# Patient Record
(demographics unavailable — no encounter records)

---

## 2024-10-18 NOTE — REASON FOR VISIT
Congratulations again on the birth of your baby!      The first six weeks following your delivery are known as the postpartum period.  Here are some general instructions to help both you and your baby have a healthy and happy postpartum recovery.       Rest & Sleep:  Focus on yourself and baby during this time, and get plenty of rest for the first few weeks.    Sleep when your baby sleeps and allow support people help you rest (care for other children, prepare meals, shopping, cleaning, etc).  Do not lift anything heavier than your baby.  Take short walks if possible throughout the day.     Nutrition:  Eat nutritious and well balanced meals to provide your body the energy it needs.  Drink at least 8 glasses of water every day (try drinking a glass of water every time you feed the baby).  Do not diet at this time.  Breastfeeding mothers require extra fluid, calories, protein and calcium.   Avoid tobacco, alcohol and non-essential medications when breastfeeding.      Postpartum Bleeding ([place]):  Expect bleeding for up to 6 weeks.  Expect normal period odor from your bleeding.  Change your sanitary pad often, and wash your hands before changing.  DO NOT have intercourse, use tampons, or place anything in your vagina for 6-8 weeks to allow your body time to heal.     Call your midwife for foul smelling vaginal discharge, large clots, or heavy bleeding (saturating a pad in an hour).     Care of the Perineum after Vaginal Birth  Expect some swelling and tenderness for several days, especially if a tear occurred.    Use squirt bottle after urination and bowel movements  After urinating, pat dry but do not wipe.  Ice packs, Tucks pads, and/or numbing spray (Dermoplast) can be used for discomfort.     Breast Care for Breastfeeding Mothers:  Wear a supportive bra  Feed your baby on demand or at least every 2-3 hours (or 8-10 times per 24 hour day) to avoid engorgement  Position your baby's nose to be aligned with your  nipple and wait for wide open jaw (try rubbing nipple below baby's nose) and aim nipple toward the roof of your baby's mouth.   Break suction with clean finger if poor latch occurs.  Prevent sore nipples by ensuring proper latch and rubbing your expressed breast milk on your nipples after every breastfeed.   If using a breast pump, prevent sore nipples by ensuring proper flange fit and rubbing your expressed breast milk on your nipples after every pumping session.   Cracked nipples can be treated with correcting the improper latch, soothed with gel pads or dabbed with breastmilk and allowed to air dry.   If using a pump, keep clean by washing parts with warm water and dish soap after each pumping session, and allow parts to air dry on clean paper towel.  Plugged ducts are sore hard rock-like areas in the substance of the breast that can be treated by taking a warm shower, nursing your baby by positioning chin toward blockage, and massaging area toward nipple.  Mastitis is a breast infection that may include the following symptoms: fever; red, very sore area of breast; flu-like symptoms.  Call your doctor/midwife right away if these symptoms occur.     Don't hesitate to reach out to the number listed at the bottom of these instructions for additional lactation assistance and support.       Postpartum Family Planning:  Continue discussions with your provider at your follow up visit.      Postpartum Adjustment:   Becoming a mother involves many changes to your mind and body. Although you may have expected to be excited and happy, instead you may be unsure of yourself in your new role, and you may find that you are easily upset, impatient, irritable or tearful. This is normal and comes and goes quickly.  \"Baby blues\" may occur anywhere from a few days after delivery to several weeks postpartum and will pass in a short time.      Postpartum Depression:  Postpartum depression goes beyond \"baby blues\" and is persistent,  intense, and severe.  The most common symptom is a feeling of deep sadness.  You may also feel as if you just can't cope with life.  Other symptoms include:  thoughts of harming yourself or the baby  lack of interest in the baby, family, or friends  feeling guilty or worthless  feeling hopeless  uncontrollable crying  feelings of being a bad mother  trouble sleeping, oversleeping or feeling tired all the time  changes in appetite  strong feelings of irritability, anger, or nervousness  having trouble thinking clearly or making decisions  having headaches, aches and pains, or stomach problems that won't go away  thinking about death or suicide     The exact cause of postpartum depression is unknown. Changes in brain chemistry or structure are believed to play a big role in depression. It may be due to changes in your hormones during and after childbirth. You may also be tired from caring for your baby and adjusting to being a mother. All these factors may make you feel depressed. In some cases, your genes may also play a role.     Postpartum depression can be treated in many ways.  Talking to your healthcare provider is the first step toward feeling better.     Call your midwife immediately if you:  Cry for no clear reason  Have trouble sleeping, eating, and making choices  Question whether you can handle caring for your baby  Have intense feelings of sadness, anxiety, or despair that prevent you from being able to do your daily tasks     Here are some additional resources offering support for Postpartum Depression:     Postpartum Support International: (776) 895-8333   National [place] for Mental Health: (641) 102-1992  National Suicide Prevention (552) 511-1969  Postpartum Progress https://postpartumprogress.com/                            Postpartum Preeclampsia: A serious condition that occurs when a woman has high blood pressure and excess protein in her urine soon after childbirth. It usually occurs within a  few days of birth, but can develop up to 6 weeks after having the baby. Preeclampsia can result in seizures and other serious complications and requires prompt treatment.      Call your doctor or midwife immediately if you experience any of the following symptoms that could be signs of Preeclampsia:  Increased swelling in your face, hands or legs  severe headache that doesn't go away  abdominal pain  shortness of breath / difficulty breathing  nausea and vomiting   confusion  vision changes:blurred vision or flashing spots   gain more than 3 pounds in three days     Summary of when to call your doctor or midwife:  Foul smelling vaginal discharge  Passing large blood clots or heavy bleeding (saturating a pad in an hour)  Fever above 100.4F  Redness, swelling, increased pain or drainage from incision (if you had a )  Redness & pain in any area of breasts  Flu-like symptoms (such as fever, chills, body aches, etc.)  Fainting, dizziness or lightheadedness  Postpartum depression: Crying for no clear reason, having trouble sleeping, eating, and making choices; questioning whether you can handle caring for your baby; having intense feelings of sadness, anxiety, or despair that prevent you from being able to do your daily tasks  Preeclampsia symptoms: increased swelling in face, hands or legs; headache,abdominal pain, shortness of breath, nausea and vomiting, confusion, vision changes, gaining more than 3 pounds in 3 days.    New or worsening symptoms or pain, not relieved by medicine or rest         Our Hospital and Medical team have enjoyed caring for you during this special time, and we wish you a safe and healthy recovery.      If you have questions about these discharge instructions or any warning signs, please call your the Midwife on-call at [phone].  If you need additional support with breastfeeding, you can reach Lactation Support at [phone].     [Initial Consult] : an initial consult for [FreeTextEntry2] : weight loss options

## 2024-10-18 NOTE — HISTORY OF PRESENT ILLNESS
[de-identified] :   Ms. JONNY HERMAN is a 18 year old obese woman.  The patient presents requesting discussion of weight loss surgery vs weight loss medication therapy. She has been more than 75 lb. overweight for greater than 5 years.   The patient has tried numerous weight loss programs including self-directed and physician supervised diets and self-directed exercise programs. She has lost greater than 10 pounds on more than one occasion, however, has experienced weight regain despite her best efforts with healthy diet and exercise.

## 2024-10-18 NOTE — CONSULT LETTER
[Dear  ___] : Dear  [unfilled], [Consult Letter:] : I had the pleasure of evaluating your patient, [unfilled]. [Please see my note below.] : Please see my note below. [Consult Closing:] : Thank you very much for allowing me to participate in the care of this patient.  If you have any questions, please do not hesitate to contact me. [Sincerely,] : Sincerely, [FreeTextEntry3] : Chad Brumfield MD, FACS Director of Bariatric Surgery [facility]  Assistant Professor of Surgery  [facility] [facility] [employer] at [facility]

## 2024-10-18 NOTE — CONSULT LETTER
[Dear  ___] : Dear  [unfilled], [Consult Letter:] : I had the pleasure of evaluating your patient, [unfilled]. [Please see my note below.] : Please see my note below. [Consult Closing:] : Thank you very much for allowing me to participate in the care of this patient.  If you have any questions, please do not hesitate to contact me. [Sincerely,] : Sincerely, [FreeTextEntry3] : Chad Brumfield MD, FACS Director of Bariatric Surgery [facility]  Assistant Professor of Surgery  [facility] [employer] at [facility]

## 2024-10-18 NOTE — HISTORY OF PRESENT ILLNESS
[de-identified] :   Ms. JONNY HERMAN is a 18 year old obese woman.  The patient presents requesting discussion of weight loss surgery vs weight loss medication therapy. She has been more than 75 lb. overweight for greater than 5 years.   The patient has tried numerous weight loss programs including self-directed and physician supervised diets and self-directed exercise programs. She has lost greater than 10 pounds on more than one occasion, however, has experienced weight regain despite her best efforts with healthy diet and exercise.

## 2024-10-18 NOTE — CONSULT LETTER
[Dear  ___] : Dear  [unfilled], [Consult Letter:] : I had the pleasure of evaluating your patient, [unfilled]. [Please see my note below.] : Please see my note below. [Consult Closing:] : Thank you very much for allowing me to participate in the care of this patient.  If you have any questions, please do not hesitate to contact me. [Sincerely,] : Sincerely, [FreeTextEntry3] : Chad Brumfield MD, FACS Director of Bariatric Surgery [facility]  Assistant Professor of Surgery  [facility] [employer] at [facility] [place]

## 2024-10-18 NOTE — ASSESSMENT
[FreeTextEntry1] :  Ms. JONNY HERMAN is a 18 year old woman with morbid obesity who is unable to lose weight and lower her risk of co-morbid conditions with diet and exercise. She wishes to proceed with planning for weight loss medication therapy.

## 2024-10-18 NOTE — PLAN
[FreeTextEntry1] : The risks/benefits of weight loss surgery vs nonoperative management were discussed. We discussed the criteria used for eligibility. The patient understands. She will proceed with the nonoperative weight loss program, focusing on diet, exercise, weight loss medication, and lifestyle modification.

## 2024-10-18 NOTE — HISTORY OF PRESENT ILLNESS
[de-identified] :   Ms. JONNY HERMAN is a 18 year old obese woman.  The patient presents requesting discussion of weight loss surgery vs weight loss medication therapy. She has been more than 75 lb. overweight for greater than 5 years.   The patient has tried numerous weight loss programs including self-directed and physician supervised diets and self-directed exercise programs. She has lost greater than 10 pounds on more than one occasion, however, has experienced weight regain despite her best efforts with healthy diet and exercise.

## 2024-11-14 NOTE — HISTORY OF PRESENT ILLNESS
[FreeTextEntry1] : 19F PMH anxiety, depression, obesity presents for weight management. She was referred by bariatric surgeon, Dr. Brumfield and her PCP, Dr. Merle Gerber.  She reports 1 year ago, before being pregnant she weighted 142lbs and then she had her baby girl 10 months ago. She has trialed diets by eating healthier including smoothies, yogurt, and fruits and drinking water with no weight loss. She remains physical active by walking. She also reports food is comfort and she resorts to food when she is anxious or stressed. She has never taken any medications for weight loss.   She does not take any medications. She used to be prescribed something for her anxiety and depression in the past but never took it. She reports it is well-controlled, and she is able to cope with it well without anything. She has good support at home and denies any SI/HI. She denies any allergies and surgeries. She reports father with colon cancer at 52yo. She denies any smoking, alcohol, and drug use. 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 She lives with her daughter and her partner.

## 2024-11-14 NOTE — ASSESSMENT
[FreeTextEntry1] : 19F PMH anxiety, depression, obesity presents for weight management. She was referred by bariatric surgeon, Dr. Brumfield and her PCP, Dr. Merle Gerber.  # obesity - BMI 32, long-standing issue with weight loss in the past now with weight regain and plateau. Trialed diets and physical activity with no improvement - given Medicaid insurance and coverage policy - no weight loss medications are being covered by insurance as patient is interested in injectables -  long discussion had about Wegovy/Zepbound and its mechanism of action - patient denies any family or personal history of medullary thyroid cancer and pancreatitis. - She denies any symptoms of reflux, abdominal pain, n/v, counseled on adverse GI side-effects - discussed Qsymia and contrave - denies history of uncontrolled high BP, kidney stones, and glaucoma but has anxiety and depression, untreated but controlled - explained the teratogenicity of these medication - patient with no plans of pregnancy and she understands risks - discussed metformin, off-label for weight loss. Patient is hesitant and wants to think about it - counseled on GI side effects - recommending nutritionist - referral provided - advised low carb diet and increased physical activity - sees PCP, Dr. Merle Gerber - encouraged to send me recent metabolic panel blood work before prescription of any medication - patient agreeable to visits every month for symptom monitoring and up-titration if started on a medication. She will get back to me with her decision. She verbalized understanding of all above